# Patient Record
Sex: FEMALE | Race: OTHER | NOT HISPANIC OR LATINO
[De-identification: names, ages, dates, MRNs, and addresses within clinical notes are randomized per-mention and may not be internally consistent; named-entity substitution may affect disease eponyms.]

---

## 2019-10-08 ENCOUNTER — APPOINTMENT (OUTPATIENT)
Dept: ANTEPARTUM | Facility: CLINIC | Age: 24
End: 2019-10-08
Payer: MEDICAID

## 2019-10-08 PROCEDURE — 76816 OB US FOLLOW-UP PER FETUS: CPT

## 2019-10-08 PROCEDURE — 76819 FETAL BIOPHYS PROFIL W/O NST: CPT

## 2019-10-14 ENCOUNTER — TRANSCRIPTION ENCOUNTER (OUTPATIENT)
Age: 24
End: 2019-10-14

## 2019-10-14 ENCOUNTER — OUTPATIENT (OUTPATIENT)
Dept: INPATIENT UNIT | Facility: HOSPITAL | Age: 24
LOS: 1 days | Discharge: HOME | End: 2019-10-14
Payer: MEDICAID

## 2019-10-14 VITALS — HEART RATE: 85 BPM | DIASTOLIC BLOOD PRESSURE: 78 MMHG | SYSTOLIC BLOOD PRESSURE: 115 MMHG

## 2019-10-14 VITALS
HEART RATE: 82 BPM | RESPIRATION RATE: 16 BRPM | TEMPERATURE: 99 F | DIASTOLIC BLOOD PRESSURE: 89 MMHG | SYSTOLIC BLOOD PRESSURE: 121 MMHG

## 2019-10-14 DIAGNOSIS — Z3A.00 WEEKS OF GESTATION OF PREGNANCY NOT SPECIFIED: ICD-10-CM

## 2019-10-14 DIAGNOSIS — O26.899 OTHER SPECIFIED PREGNANCY RELATED CONDITIONS, UNSPECIFIED TRIMESTER: ICD-10-CM

## 2019-10-14 DIAGNOSIS — Z83.79 FAMILY HISTORY OF OTHER DISEASES OF THE DIGESTIVE SYSTEM: Chronic | ICD-10-CM

## 2019-10-14 PROCEDURE — 59025 FETAL NON-STRESS TEST: CPT | Mod: 26

## 2019-10-14 NOTE — OB PROVIDER TRIAGE NOTE - NSOBPROVIDERNOTE_OBGYN_ALL_OB_FT
23 y.o.  ALBERTA 10/19/19 @ 39.4 weeks presents with c/o ctx q 10-15 mins. Pt denies LOF, VB. States +FM. Antepartum course complicated by GDMA1.    allergies:  NKDA  medications:  prenatal vitamins    med/surg hx:  2011 nehrolithiasis  OBGYN hx:  complete sab x 1    abdomen soft, nontender  sve /-3/I  nst in progress 23 y.o.  ALBERTA 10/19/19 @ 39.4 weeks presents with c/o ctx q 10-15 mins. Pt denies LOF, VB. States +FM. Antepartum course complicated by GDMA1.    allergies:  NKDA  medications:  prenatal vitamins    med/surg hx:  2011 nehrolithiasis  OBGYN hx:  complete sab x 1    abdomen soft, nontender  sve /-3/I  nst in progress    1251    nst reactive  toco: ctx q 10-15 mins    no evidence of labor at this time    discussed with Dr Van. Pt discharged home with labor precautions/fetal kick counts reviewed. Encouraged increased PO hydration. F/U next scheduled prenatal appointment.    Derrick, NP

## 2019-10-14 NOTE — OB PROVIDER TRIAGE NOTE - HISTORY OF PRESENT ILLNESS
23 y.o.  ALBERTA 10/19/19 @ 39.4 weeks presents with c/o ctx q 10-15 mins. Pt denies LOF, VB. States +FM. Antepartum course complicated by GDMA1.

## 2019-10-15 ENCOUNTER — APPOINTMENT (OUTPATIENT)
Dept: OBGYN | Facility: CLINIC | Age: 24
End: 2019-10-15

## 2019-10-15 ENCOUNTER — INPATIENT (INPATIENT)
Facility: HOSPITAL | Age: 24
LOS: 2 days | Discharge: HOME | End: 2019-10-18
Attending: OBSTETRICS & GYNECOLOGY | Admitting: OBSTETRICS & GYNECOLOGY
Payer: MEDICAID

## 2019-10-15 ENCOUNTER — TRANSCRIPTION ENCOUNTER (OUTPATIENT)
Age: 24
End: 2019-10-15

## 2019-10-15 VITALS
TEMPERATURE: 98 F | DIASTOLIC BLOOD PRESSURE: 77 MMHG | SYSTOLIC BLOOD PRESSURE: 122 MMHG | RESPIRATION RATE: 17 BRPM | HEART RATE: 88 BPM

## 2019-10-15 DIAGNOSIS — K80.01 CALCULUS OF GALLBLADDER WITH ACUTE CHOLECYSTITIS WITH OBSTRUCTION: Chronic | ICD-10-CM

## 2019-10-15 DIAGNOSIS — O26.899 OTHER SPECIFIED PREGNANCY RELATED CONDITIONS, UNSPECIFIED TRIMESTER: ICD-10-CM

## 2019-10-15 DIAGNOSIS — Z22.330 CARRIER OF GROUP B STREPTOCOCCUS: ICD-10-CM

## 2019-10-15 DIAGNOSIS — Z3A.00 WEEKS OF GESTATION OF PREGNANCY NOT SPECIFIED: ICD-10-CM

## 2019-10-15 LAB
BASOPHILS # BLD AUTO: 0.07 K/UL — SIGNIFICANT CHANGE UP (ref 0–0.2)
BASOPHILS NFR BLD AUTO: 0.5 % — SIGNIFICANT CHANGE UP (ref 0–2)
BLD GP AB SCN SERPL QL: NEGATIVE — SIGNIFICANT CHANGE UP
EOSINOPHIL # BLD AUTO: 0.02 K/UL — SIGNIFICANT CHANGE UP (ref 0–0.5)
EOSINOPHIL NFR BLD AUTO: 0.1 % — SIGNIFICANT CHANGE UP (ref 0–6)
GLUCOSE BLDC GLUCOMTR-MCNC: 115 MG/DL — HIGH (ref 70–99)
GLUCOSE BLDC GLUCOMTR-MCNC: 82 MG/DL — SIGNIFICANT CHANGE UP (ref 70–99)
HCT VFR BLD CALC: 42.4 % — SIGNIFICANT CHANGE UP (ref 34.5–45)
HGB BLD-MCNC: 13.9 G/DL — SIGNIFICANT CHANGE UP (ref 11.5–15.5)
IMM GRANULOCYTES NFR BLD AUTO: 2.1 % — HIGH (ref 0–1.5)
LYMPHOCYTES # BLD AUTO: 14.2 % — SIGNIFICANT CHANGE UP (ref 13–44)
LYMPHOCYTES # BLD AUTO: 2.13 K/UL — SIGNIFICANT CHANGE UP (ref 1–3.3)
MCHC RBC-ENTMCNC: 28.1 PG — SIGNIFICANT CHANGE UP (ref 27–34)
MCHC RBC-ENTMCNC: 32.8 % — SIGNIFICANT CHANGE UP (ref 32–36)
MCV RBC AUTO: 85.8 FL — SIGNIFICANT CHANGE UP (ref 80–100)
MONOCYTES # BLD AUTO: 0.6 K/UL — SIGNIFICANT CHANGE UP (ref 0–0.9)
MONOCYTES NFR BLD AUTO: 4 % — SIGNIFICANT CHANGE UP (ref 2–14)
NEUTROPHILS # BLD AUTO: 11.84 K/UL — HIGH (ref 1.8–7.4)
NEUTROPHILS NFR BLD AUTO: 79.1 % — HIGH (ref 43–77)
NRBC # FLD: 0.03 K/UL — SIGNIFICANT CHANGE UP (ref 0–0)
PLATELET # BLD AUTO: 218 K/UL — SIGNIFICANT CHANGE UP (ref 150–400)
PMV BLD: 11.6 FL — SIGNIFICANT CHANGE UP (ref 7–13)
RBC # BLD: 4.94 M/UL — SIGNIFICANT CHANGE UP (ref 3.8–5.2)
RBC # FLD: 15.2 % — HIGH (ref 10.3–14.5)
RH IG SCN BLD-IMP: POSITIVE — SIGNIFICANT CHANGE UP
RH IG SCN BLD-IMP: POSITIVE — SIGNIFICANT CHANGE UP
T PALLIDUM AB TITR SER: NEGATIVE — SIGNIFICANT CHANGE UP
WBC # BLD: 14.97 K/UL — HIGH (ref 3.8–10.5)
WBC # FLD AUTO: 14.97 K/UL — HIGH (ref 3.8–10.5)

## 2019-10-15 PROCEDURE — 59410 OBSTETRICAL CARE: CPT | Mod: U9

## 2019-10-15 RX ORDER — HYDROMORPHONE HYDROCHLORIDE 2 MG/ML
1 INJECTION INTRAMUSCULAR; INTRAVENOUS; SUBCUTANEOUS
Refills: 0 | Status: DISCONTINUED | OUTPATIENT
Start: 2019-10-15 | End: 2019-10-17

## 2019-10-15 RX ORDER — TETANUS TOXOID, REDUCED DIPHTHERIA TOXOID AND ACELLULAR PERTUSSIS VACCINE, ADSORBED 5; 2.5; 8; 8; 2.5 [IU]/.5ML; [IU]/.5ML; UG/.5ML; UG/.5ML; UG/.5ML
0.5 SUSPENSION INTRAMUSCULAR ONCE
Refills: 0 | Status: DISCONTINUED | OUTPATIENT
Start: 2019-10-15 | End: 2019-10-18

## 2019-10-15 RX ORDER — SODIUM CHLORIDE 9 MG/ML
1000 INJECTION, SOLUTION INTRAVENOUS ONCE
Refills: 0 | Status: COMPLETED | OUTPATIENT
Start: 2019-10-15 | End: 2019-10-15

## 2019-10-15 RX ORDER — HYDROMORPHONE HYDROCHLORIDE 2 MG/ML
0.5 INJECTION INTRAMUSCULAR; INTRAVENOUS; SUBCUTANEOUS
Refills: 0 | Status: DISCONTINUED | OUTPATIENT
Start: 2019-10-15 | End: 2019-10-17

## 2019-10-15 RX ORDER — AMPICILLIN TRIHYDRATE 250 MG
1 CAPSULE ORAL EVERY 4 HOURS
Refills: 0 | Status: DISCONTINUED | OUTPATIENT
Start: 2019-10-15 | End: 2019-10-15

## 2019-10-15 RX ORDER — SIMETHICONE 80 MG/1
80 TABLET, CHEWABLE ORAL EVERY 4 HOURS
Refills: 0 | Status: DISCONTINUED | OUTPATIENT
Start: 2019-10-15 | End: 2019-10-18

## 2019-10-15 RX ORDER — AMPICILLIN TRIHYDRATE 250 MG
2 CAPSULE ORAL ONCE
Refills: 0 | Status: COMPLETED | OUTPATIENT
Start: 2019-10-15 | End: 2019-10-15

## 2019-10-15 RX ORDER — FAMOTIDINE 10 MG/ML
20 INJECTION INTRAVENOUS ONCE
Refills: 0 | Status: COMPLETED | OUTPATIENT
Start: 2019-10-15 | End: 2019-10-15

## 2019-10-15 RX ORDER — OXYCODONE HYDROCHLORIDE 5 MG/1
5 TABLET ORAL
Refills: 0 | Status: DISCONTINUED | OUTPATIENT
Start: 2019-10-15 | End: 2019-10-15

## 2019-10-15 RX ORDER — MAGNESIUM HYDROXIDE 400 MG/1
30 TABLET, CHEWABLE ORAL
Refills: 0 | Status: DISCONTINUED | OUTPATIENT
Start: 2019-10-15 | End: 2019-10-18

## 2019-10-15 RX ORDER — ONDANSETRON 8 MG/1
4 TABLET, FILM COATED ORAL EVERY 6 HOURS
Refills: 0 | Status: DISCONTINUED | OUTPATIENT
Start: 2019-10-15 | End: 2019-10-17

## 2019-10-15 RX ORDER — SODIUM CHLORIDE 9 MG/ML
1000 INJECTION, SOLUTION INTRAVENOUS
Refills: 0 | Status: DISCONTINUED | OUTPATIENT
Start: 2019-10-15 | End: 2019-10-15

## 2019-10-15 RX ORDER — DIPHENHYDRAMINE HCL 50 MG
25 CAPSULE ORAL EVERY 6 HOURS
Refills: 0 | Status: DISCONTINUED | OUTPATIENT
Start: 2019-10-15 | End: 2019-10-18

## 2019-10-15 RX ORDER — OXYTOCIN 10 UNIT/ML
2 VIAL (ML) INJECTION
Qty: 30 | Refills: 0 | Status: DISCONTINUED | OUTPATIENT
Start: 2019-10-15 | End: 2019-10-15

## 2019-10-15 RX ORDER — SODIUM CHLORIDE 9 MG/ML
1000 INJECTION, SOLUTION INTRAVENOUS
Refills: 0 | Status: DISCONTINUED | OUTPATIENT
Start: 2019-10-15 | End: 2019-10-16

## 2019-10-15 RX ORDER — ONDANSETRON 8 MG/1
4 TABLET, FILM COATED ORAL ONCE
Refills: 0 | Status: DISCONTINUED | OUTPATIENT
Start: 2019-10-15 | End: 2019-10-17

## 2019-10-15 RX ORDER — NALOXONE HYDROCHLORIDE 4 MG/.1ML
0.1 SPRAY NASAL
Refills: 0 | Status: DISCONTINUED | OUTPATIENT
Start: 2019-10-15 | End: 2019-10-17

## 2019-10-15 RX ORDER — OXYTOCIN 10 UNIT/ML
333.33 VIAL (ML) INJECTION
Qty: 20 | Refills: 0 | Status: DISCONTINUED | OUTPATIENT
Start: 2019-10-15 | End: 2019-10-15

## 2019-10-15 RX ORDER — LANOLIN
1 OINTMENT (GRAM) TOPICAL EVERY 6 HOURS
Refills: 0 | Status: DISCONTINUED | OUTPATIENT
Start: 2019-10-15 | End: 2019-10-18

## 2019-10-15 RX ORDER — METOCLOPRAMIDE HCL 10 MG
10 TABLET ORAL ONCE
Refills: 0 | Status: COMPLETED | OUTPATIENT
Start: 2019-10-15 | End: 2019-10-15

## 2019-10-15 RX ORDER — DOCUSATE SODIUM 100 MG
100 CAPSULE ORAL
Refills: 0 | Status: DISCONTINUED | OUTPATIENT
Start: 2019-10-15 | End: 2019-10-18

## 2019-10-15 RX ORDER — OXYCODONE HYDROCHLORIDE 5 MG/1
10 TABLET ORAL
Refills: 0 | Status: DISCONTINUED | OUTPATIENT
Start: 2019-10-15 | End: 2019-10-15

## 2019-10-15 RX ORDER — GLYCERIN ADULT
1 SUPPOSITORY, RECTAL RECTAL AT BEDTIME
Refills: 0 | Status: DISCONTINUED | OUTPATIENT
Start: 2019-10-15 | End: 2019-10-18

## 2019-10-15 RX ORDER — BUTORPHANOL TARTRATE 2 MG/ML
0.12 INJECTION, SOLUTION INTRAMUSCULAR; INTRAVENOUS EVERY 6 HOURS
Refills: 0 | Status: DISCONTINUED | OUTPATIENT
Start: 2019-10-15 | End: 2019-10-15

## 2019-10-15 RX ORDER — AMPICILLIN TRIHYDRATE 250 MG
CAPSULE ORAL
Refills: 0 | Status: DISCONTINUED | OUTPATIENT
Start: 2019-10-15 | End: 2019-10-15

## 2019-10-15 RX ORDER — OXYTOCIN 10 UNIT/ML
333.33 VIAL (ML) INJECTION
Qty: 20 | Refills: 0 | Status: DISCONTINUED | OUTPATIENT
Start: 2019-10-15 | End: 2019-10-17

## 2019-10-15 RX ORDER — CITRIC ACID/SODIUM CITRATE 300-500 MG
30 SOLUTION, ORAL ORAL ONCE
Refills: 0 | Status: COMPLETED | OUTPATIENT
Start: 2019-10-15 | End: 2019-10-15

## 2019-10-15 RX ORDER — DEXAMETHASONE 0.5 MG/5ML
4 ELIXIR ORAL EVERY 6 HOURS
Refills: 0 | Status: DISCONTINUED | OUTPATIENT
Start: 2019-10-15 | End: 2019-10-17

## 2019-10-15 RX ADMIN — Medication 1000 MILLIUNIT(S)/MIN: at 18:56

## 2019-10-15 RX ADMIN — Medication 216 GRAM(S): at 09:30

## 2019-10-15 RX ADMIN — Medication 10 MILLIGRAM(S): at 15:56

## 2019-10-15 RX ADMIN — FAMOTIDINE 20 MILLIGRAM(S): 10 INJECTION INTRAVENOUS at 15:56

## 2019-10-15 RX ADMIN — Medication 2 MILLIUNIT(S)/MIN: at 13:20

## 2019-10-15 RX ADMIN — Medication 30 MILLILITER(S): at 15:56

## 2019-10-15 RX ADMIN — Medication 108 GRAM(S): at 13:30

## 2019-10-15 RX ADMIN — SODIUM CHLORIDE 125 MILLILITER(S): 9 INJECTION, SOLUTION INTRAVENOUS at 09:30

## 2019-10-15 RX ADMIN — SODIUM CHLORIDE 125 MILLILITER(S): 9 INJECTION, SOLUTION INTRAVENOUS at 18:56

## 2019-10-15 RX ADMIN — ONDANSETRON 4 MILLIGRAM(S): 8 TABLET, FILM COATED ORAL at 22:46

## 2019-10-15 RX ADMIN — SODIUM CHLORIDE 2000 MILLILITER(S): 9 INJECTION, SOLUTION INTRAVENOUS at 08:50

## 2019-10-15 NOTE — DISCHARGE NOTE OB - MEDICATION SUMMARY - MEDICATIONS TO TAKE
I will START or STAY ON the medications listed below when I get home from the hospital:    ibuprofen 600 mg oral tablet  -- 1 tab(s) by mouth every 6 hours  -- Indication: For  delivery delivered    acetaminophen 325 mg oral tablet  -- 3 tab(s) by mouth every 6 hours  -- Indication: For  delivery delivered    PNV Prenatal oral tablet  -- 1 tab(s) by mouth once a day  -- Indication: For  delivery delivered

## 2019-10-15 NOTE — BRIEF OPERATIVE NOTE - OPERATION/FINDINGS
Delivery of viable male infant, apgars 9,10, weight=2890, cephalic presentation (OP, asynclitic). Grossly normal uterus, tubes, ovaries.   Hysterotomy closed in 2 layers. Philippe placed over hysterotomy. Excellent hemostasis noted.

## 2019-10-15 NOTE — OB RN PATIENT PROFILE - NS_ADMITVITALS_OBGYN_ALL_OB_DT
We will move forward with a repeat heart catheterization to assess your progressive fatigue. We are concerned that your symptoms may indicate a new blockage or narrowing of an old stent. Continue your ASA and Plavix. I am going to send in a new Rx for your 90 day supply today. Regarding your irregular heart rhythm afib, we will have you wear a 30 day monitor. We will also do an Echocardiogram in the near future. At this time, we will continue your Amiodarone. There are other options for treatement of sleep apnea there than a CPAP mask. 15-Oct-2019 09:20

## 2019-10-15 NOTE — DISCHARGE NOTE OB - PATIENT PORTAL LINK FT
You can access the FollowMyHealth Patient Portal offered by [facility] by registering at the following website: [email or web address]. By joining [employer]’s FollowMyHealth portal, you will also be able to view your health information using other applications (apps) compatible with our system.

## 2019-10-15 NOTE — OB NEONATOLOGY/PEDIATRICIAN DELIVERY SUMMARY - NSPEDSNEONOTESA_OBGYN_ALL_OB_FT
Baby is a 39.3 week GA M born to a 22 y/o  mother via unscheduled C/S for category 2 tracings. Maternal history of SABx1. Pregnancy complicated by GDMA1. Maternal blood type A+. Prenatal labs HIV, Hep B neg, Rubella immune, RPR nonreactive. GBS - on . ROM <18hrs with light meconium fluid. Baby born vigorous and crying spontaneously. Warmed, dried, stimulated. Apgars 9 / 10.

## 2019-10-15 NOTE — PROGRESS NOTE ADULT - SUBJECTIVE AND OBJECTIVE BOX
Pt admitted with categ 2 tracing for IOL>  Pt with repetitve late decels, unable to go up on pitocin, minimal cervical change. Recommend c/s at this time for categ 2 tracing remote from delivery.  Discussed with patient and partner who verbalized understanding.

## 2019-10-15 NOTE — DISCHARGE NOTE OB - MATERIALS PROVIDED
Guide to Postpartum Care/Breastfeeding Mother’s Support Group Information/[place]  Immunization Record/Breastfeeding Guide and Packet/[facility]  Screening Program/Breastfeeding Log/Back To Sleep Handout/Discharge Medication Information for Patients and Families Pocket Guide/Shaken Baby Prevention Handout/[facility] Hearing Screen Program/Birth Certificate Instructions

## 2019-10-15 NOTE — OB PROVIDER H&P - NSHPPHYSICALEXAM_GEN_ALL_CORE
VSS  Abdomen gravid, soft and nontender  NST - CAT 2 FHT; rep. late decelerations  SVE - 2/70/-3 Intact  Cephalic presentation  GBS - VSS  Abdomen gravid, soft and nontender  NST - CAT 2 FHT; rep. late decelerations  SVE - 2/70/-3 Intact  TAS - VTX/ELMER 90.8  BPP - 8/8  GBS - positive; for ampicillin

## 2019-10-15 NOTE — OB PROVIDER TRIAGE NOTE - HISTORY OF PRESENT ILLNESS
Patient is a  39 3/7wks gestation who reports to triage with c/o contractions since 0400.  Denies lof/vb.  Reports  good fetal movements.    AP Course - GDMA1  Meds - pnv  NKDA

## 2019-10-15 NOTE — BRIEF OPERATIVE NOTE - NSICDXBRIEFPREOP_GEN_ALL_CORE_FT
PRE-OP DIAGNOSIS:  Non-reassuring fetal heart tones complicating pregnancy, antepartum 15-Oct-2019 21:48:28  Yael Norman

## 2019-10-15 NOTE — OB PROVIDER TRIAGE NOTE - NSHPPHYSICALEXAM_GEN_ALL_CORE
VSS  Abdomen gravid, soft and nontender  NST -  SVE - 2/70/-3 Intact  Cephalic presentation VSS  Abdomen gravid, soft and nontender  NST - CAT 2; Late decelerations  Patient turned to lt/O2 mask in place  SVE - 2/70/-3 Intact  Cephalic presentation  TAS - vtx/mao 90.8  BPP - 8/8

## 2019-10-15 NOTE — DISCHARGE NOTE OB - CARE PROVIDER_API CALL
Hosea Butler)  MaternalFetal Medicine; Obstetrics and Gynecology  [address] [address] [place] [address], Room [address]  [place] [address]  Phone: [phone]  Fax: [phone]  Follow Up Time:

## 2019-10-15 NOTE — CHART NOTE - NSCHARTNOTEFT_GEN_A_CORE
R4 OB Progress Note    Patient seen and evaluated at bedside.  Received sign out on patient from Allie Tsang.  Presented w/ c/o contractions however only found to be 2 cm.   Tracing w/ intermittent late decels so decision made to keep patient for induction.      T(C): 36.9 (10-15-19 @ 07:27), Max: 37.4 (10-14-19 @ 11:56)  HR: 87 (10-15-19 @ 09:24) (78 - 88)  BP: 128/78 (10-15-19 @ 09:19) (115/78 - 128/78)  RR: 17 (10-15-19 @ 07:27) (16 - 17)  SpO2: 99% (10-15-19 @ 09:24) (99% - 100%)    SVE: 2-3/70/-3  mid position, firm tone    EFM: 140/mod pooja/+accel, intermittent late decels  [place]:  ctx intermittent     A/P 23y P0 admitted for IOL for category 2 tracing.   Tracing reviewed w/  Dr. Herbert.   Overall reassuring w/ moderate variability.   Will proceed with attempt at induction.   Will attempt resuscitation w/ lateral tilt, O2 and IV fluids and attempt induction w/ cervical balloon and hogan once tracing resuscitated.    -Labor: will place cervical balloon and start pitocin once tracing resucitated x 20-30 mins  -Fetus: category 2 tracing however overall reassuring as above  -GBS positive: continue ampicillin ppx  -Analgesia: declines at this time    Ann Martinez PGY-4  d/w Dr. Herbert  d/w Dr. Washburn

## 2019-10-15 NOTE — BRIEF OPERATIVE NOTE - NSICDXBRIEFPOSTOP_GEN_ALL_CORE_FT
POST-OP DIAGNOSIS:  Non-reassuring fetal heart tones complicating pregnancy, antepartum 15-Oct-2019 21:48:33  Yael Norman

## 2019-10-15 NOTE — CHART NOTE - NSCHARTNOTEFT_GEN_A_CORE
Pa note    patient seen & examined for possible placement of cervical balloon. Uncomfortable, denies pain interventions    VS  T(C): 36.9 (10-15-19 @ 07:27)  HR: 87 (10-15-19 @ 09:24)  BP: 128/78 (10-15-19 @ 09:19)  RR: 17 (10-15-19 @ 07:27)  SpO2: 99% (10-15-19 @ 09:24)    130/mod pooja/+accels/no decels  [place] q 4-5min  VE 3/80/-2    cont efm/toco  cervical balloon not placed at this time  will expectantly manage at this time  will dw Dr Wu Stiles PGY4  ldjesika de anda

## 2019-10-15 NOTE — DISCHARGE NOTE OB - CARE PLAN
Principal Discharge DX:	 delivery delivered  Goal:	recovery  Assessment and plan of treatment:	with Dr. Butler in 2 weeks

## 2019-10-15 NOTE — OB RN DELIVERY SUMMARY - NS_SEPSISRSKCALC_OBGYN_ALL_OB_FT
No temperature has been documented for this patient in CPN or on the OB Flowsheet. Ensure the highest temperature during labor was documented on the OB Flowsheet.  No gestational age at birth has been documented. Ensure delivery date/time has been entered above.  Rupture of membranes must be entered above. EOS calculated successfully. EOS Risk Factor: 0.5/1000 live births ([facility] national incidence); GA=39w3d; Temp=98.96; ROM=0; GBS='Positive'; Antibiotics='Broad spectrum antibiotics > 4 hrs prior to birth'

## 2019-10-15 NOTE — OB RN PATIENT PROFILE - PSH
<<-----Click on this checkbox to enter Past Surgical History Calculus of gallbladder with acute cholecystitis and obstruction

## 2019-10-15 NOTE — OB PROVIDER TRIAGE NOTE - NSOBPROVIDERNOTE_OBGYN_ALL_OB_FT
Patient is a  39 3/7wks gestation who reports to triage with c/o contractions since 0400.  Denies lof/vb.  Reports  good fetal movements.    AP Course - GDMA1  Meds - pnv  NKDA    PMH/GYN/SH - deneis  PSH - nephrolithotomy - 8yrs ago  OB - missed ab - 1/2019    VSS  Abdomen gravid, soft and nontender  NST -  SVE - 2/70/-3 Intact  Cephalic presentation    Discussed patient with   Plan: Patient is a  39 3/7wks gestation who reports to triage with c/o contractions since 0400.  Denies lof/vb.  Reports  good fetal movements.    AP Course - GDMA1  Meds - pnv  NKDA    PMH/GYN/SH - deneis  PSH - nephrolithotomy - 8yrs ago  OB - missed ab - 1/2019    VSS  Abdomen gravid, soft and nontender  NST - CAT 2; Late decelerations  Patient turned to lt/O2 mask in place  SVE - 2/70/-3 Intact  Cephalic presentation  TAS - vtx/mao 90.8  BPP - 8/8    Discussed patient with Dr. Washburn.  FHT reviewed by Dr Martinez.  Plan:  admit to L&D for IOL 2' CAT 2 FHT.

## 2019-10-15 NOTE — OB PROVIDER H&P - ASSESSMENT
VSS; FS 82mg/dl  Patient is a 22 y/o  @ 39 3/7wks gestation in early labor with a CAT 2 FHT.  GDMA1.  GBS

## 2019-10-16 LAB
BASOPHILS # BLD AUTO: 0.04 K/UL — SIGNIFICANT CHANGE UP (ref 0–0.2)
BASOPHILS NFR BLD AUTO: 0.2 % — SIGNIFICANT CHANGE UP (ref 0–2)
EOSINOPHIL # BLD AUTO: 0.05 K/UL — SIGNIFICANT CHANGE UP (ref 0–0.5)
EOSINOPHIL NFR BLD AUTO: 0.3 % — SIGNIFICANT CHANGE UP (ref 0–6)
HCT VFR BLD CALC: 35.8 % — SIGNIFICANT CHANGE UP (ref 34.5–45)
HGB BLD-MCNC: 11.6 G/DL — SIGNIFICANT CHANGE UP (ref 11.5–15.5)
IMM GRANULOCYTES NFR BLD AUTO: 0.9 % — SIGNIFICANT CHANGE UP (ref 0–1.5)
LYMPHOCYTES # BLD AUTO: 1.68 K/UL — SIGNIFICANT CHANGE UP (ref 1–3.3)
LYMPHOCYTES # BLD AUTO: 8.6 % — LOW (ref 13–44)
MCHC RBC-ENTMCNC: 28.2 PG — SIGNIFICANT CHANGE UP (ref 27–34)
MCHC RBC-ENTMCNC: 32.4 % — SIGNIFICANT CHANGE UP (ref 32–36)
MCV RBC AUTO: 86.9 FL — SIGNIFICANT CHANGE UP (ref 80–100)
MONOCYTES # BLD AUTO: 0.63 K/UL — SIGNIFICANT CHANGE UP (ref 0–0.9)
MONOCYTES NFR BLD AUTO: 3.2 % — SIGNIFICANT CHANGE UP (ref 2–14)
NEUTROPHILS # BLD AUTO: 16.92 K/UL — HIGH (ref 1.8–7.4)
NEUTROPHILS NFR BLD AUTO: 86.8 % — HIGH (ref 43–77)
NRBC # FLD: 0.03 K/UL — SIGNIFICANT CHANGE UP (ref 0–0)
PLATELET # BLD AUTO: 181 K/UL — SIGNIFICANT CHANGE UP (ref 150–400)
PMV BLD: 11.5 FL — SIGNIFICANT CHANGE UP (ref 7–13)
RBC # BLD: 4.12 M/UL — SIGNIFICANT CHANGE UP (ref 3.8–5.2)
RBC # FLD: 15.5 % — HIGH (ref 10.3–14.5)
WBC # BLD: 19.49 K/UL — HIGH (ref 3.8–10.5)
WBC # FLD AUTO: 19.49 K/UL — HIGH (ref 3.8–10.5)

## 2019-10-16 RX ORDER — KETOROLAC TROMETHAMINE 30 MG/ML
30 SYRINGE (ML) INJECTION EVERY 6 HOURS
Refills: 0 | Status: DISCONTINUED | OUTPATIENT
Start: 2019-10-16 | End: 2019-10-17

## 2019-10-16 RX ORDER — HEPARIN SODIUM 5000 [USP'U]/ML
5000 INJECTION INTRAVENOUS; SUBCUTANEOUS EVERY 12 HOURS
Refills: 0 | Status: DISCONTINUED | OUTPATIENT
Start: 2019-10-16 | End: 2019-10-18

## 2019-10-16 RX ORDER — METOCLOPRAMIDE HCL 10 MG
5 TABLET ORAL ONCE
Refills: 0 | Status: COMPLETED | OUTPATIENT
Start: 2019-10-16 | End: 2019-10-16

## 2019-10-16 RX ORDER — SODIUM CHLORIDE 9 MG/ML
1000 INJECTION, SOLUTION INTRAVENOUS
Refills: 0 | Status: DISCONTINUED | OUTPATIENT
Start: 2019-10-16 | End: 2019-10-17

## 2019-10-16 RX ADMIN — Medication 30 MILLIGRAM(S): at 11:51

## 2019-10-16 RX ADMIN — Medication 30 MILLIGRAM(S): at 00:30

## 2019-10-16 RX ADMIN — ONDANSETRON 4 MILLIGRAM(S): 8 TABLET, FILM COATED ORAL at 04:47

## 2019-10-16 RX ADMIN — HEPARIN SODIUM 5000 UNIT(S): 5000 INJECTION INTRAVENOUS; SUBCUTANEOUS at 14:04

## 2019-10-16 RX ADMIN — Medication 5 MILLIGRAM(S): at 08:32

## 2019-10-16 RX ADMIN — HEPARIN SODIUM 5000 UNIT(S): 5000 INJECTION INTRAVENOUS; SUBCUTANEOUS at 01:55

## 2019-10-16 RX ADMIN — Medication 4 MILLIGRAM(S): at 00:04

## 2019-10-16 RX ADMIN — Medication 30 MILLIGRAM(S): at 00:07

## 2019-10-16 RX ADMIN — Medication 30 MILLIGRAM(S): at 06:09

## 2019-10-16 RX ADMIN — Medication 30 MILLIGRAM(S): at 18:27

## 2019-10-16 NOTE — PROGRESS NOTE ADULT - ASSESSMENT
A/P: 24yo POD#1 s/p LTCS.  Patient continues to vomit with PO intake. Will keep  running for now. Zofran PRN and will reassess at noon

## 2019-10-16 NOTE — PROVIDER CONTACT NOTE (OTHER) - ASSESSMENT
pt vomiting, medicated with Zofran ,then decadron with relief ,pt fell asleep,0430 ,pt awake and again vomited ,output qs, IV fluids  in progress, fundus firm ,lochia moderate ,vital signs stable ,pain well controlled

## 2019-10-16 NOTE — PROGRESS NOTE ADULT - PROBLEM SELECTOR PLAN 1
- Continue regular diet.  - Increase ambulation.  - Continue motrin, tylenol, oxycodone PRN for pain control.  - F/u AM CBC    Kuldip Badillo PGY1

## 2019-10-16 NOTE — PROGRESS NOTE ADULT - SUBJECTIVE AND OBJECTIVE BOX
OB Progress Note:  Delivery, POD#1    S: 24yo POD#1 s/p LTCS . Her pain is well controlled. Pt complained of N/V overnight and recieved Zofranx2 and Decadron. Denies CP/SOB/lightheadedness/dizziness. Pt denies sxs of PEC: denies headache, visual changes, RUQ pain, respiratory distress  She is ambulating without difficulty.   Voiding spontaneously.     O:   Vital Signs Last 24 Hrs  T(C): 37 (16 Oct 2019 06:38), Max: 37.2 (15 Oct 2019 13:24)  T(F): 98.6 (16 Oct 2019 06:38), Max: 98.96 (15 Oct 2019 13:24)  HR: 62 (16 Oct 2019 06:38) (55 - 125)  BP: 108/68 (16 Oct 2019 06:38) (94/60 - 145/64)  BP(mean): 81 (15 Oct 2019 21:30) (68 - 88)  RR: 17 (16 Oct 2019 06:38) (13 - 24)  SpO2: 97% (16 Oct 2019 06:38) (93% - 100%)    Labs:  Blood type: A Positive  Rubella IgG: RPR: Negative                          13.9   14.97<H> >-----------< 218    ( 10-15 @ 09:26 )             42.4          PE:  General: NAD  Abdomen: Mildly distended, appropriately tender, incision c/d/i.  Extremities: No erythema, no pitting edema

## 2019-10-17 RX ORDER — IBUPROFEN 200 MG
1 TABLET ORAL
Qty: 0 | Refills: 0 | DISCHARGE
Start: 2019-10-17

## 2019-10-17 RX ORDER — ACETAMINOPHEN 500 MG
975 TABLET ORAL EVERY 6 HOURS
Refills: 0 | Status: DISCONTINUED | OUTPATIENT
Start: 2019-10-17 | End: 2019-10-18

## 2019-10-17 RX ORDER — IBUPROFEN 200 MG
600 TABLET ORAL EVERY 6 HOURS
Refills: 0 | Status: DISCONTINUED | OUTPATIENT
Start: 2019-10-17 | End: 2019-10-18

## 2019-10-17 RX ORDER — OXYCODONE HYDROCHLORIDE 5 MG/1
5 TABLET ORAL ONCE
Refills: 0 | Status: DISCONTINUED | OUTPATIENT
Start: 2019-10-17 | End: 2019-10-18

## 2019-10-17 RX ORDER — OXYCODONE HYDROCHLORIDE 5 MG/1
5 TABLET ORAL
Refills: 0 | Status: DISCONTINUED | OUTPATIENT
Start: 2019-10-17 | End: 2019-10-18

## 2019-10-17 RX ORDER — ACETAMINOPHEN 500 MG
3 TABLET ORAL
Qty: 0 | Refills: 0 | DISCHARGE
Start: 2019-10-17

## 2019-10-17 RX ORDER — IBUPROFEN 200 MG
600 TABLET ORAL EVERY 6 HOURS
Refills: 0 | Status: COMPLETED | OUTPATIENT
Start: 2019-10-17 | End: 2020-09-14

## 2019-10-17 RX ADMIN — Medication 30 MILLIGRAM(S): at 14:58

## 2019-10-17 RX ADMIN — Medication 30 MILLIGRAM(S): at 00:39

## 2019-10-17 RX ADMIN — Medication 30 MILLIGRAM(S): at 06:25

## 2019-10-17 RX ADMIN — Medication 30 MILLIGRAM(S): at 01:00

## 2019-10-17 RX ADMIN — Medication 30 MILLIGRAM(S): at 15:15

## 2019-10-17 RX ADMIN — HEPARIN SODIUM 5000 UNIT(S): 5000 INJECTION INTRAVENOUS; SUBCUTANEOUS at 01:44

## 2019-10-17 RX ADMIN — SIMETHICONE 80 MILLIGRAM(S): 80 TABLET, CHEWABLE ORAL at 14:59

## 2019-10-17 RX ADMIN — Medication 600 MILLIGRAM(S): at 23:03

## 2019-10-17 RX ADMIN — Medication 30 MILLIGRAM(S): at 06:40

## 2019-10-17 RX ADMIN — Medication 100 MILLIGRAM(S): at 14:59

## 2019-10-17 RX ADMIN — HEPARIN SODIUM 5000 UNIT(S): 5000 INJECTION INTRAVENOUS; SUBCUTANEOUS at 14:59

## 2019-10-17 RX ADMIN — Medication 600 MILLIGRAM(S): at 23:30

## 2019-10-17 NOTE — PROGRESS NOTE ADULT - SUBJECTIVE AND OBJECTIVE BOX
OB Progress Note: pTLCS, POD#2    S: 24yo POD#2 s/p LTCS. Her pain is well controlled. She is tolerating a regular diet and passing flatus. Voiding spontaneously. Denies N/V/D. Denies CP/SOB/lightheadedness/dizziness. She is breastfeeding.    O:  Vitals:  Vital Signs Last 24 Hrs  T(C): 36.7 (17 Oct 2019 06:42), Max: 37.1 (16 Oct 2019 23:30)  T(F): 98.1 (17 Oct 2019 06:42), Max: 98.8 (16 Oct 2019 23:30)  HR: 70 (17 Oct 2019 06:42) (70 - 79)  BP: 113/74 (17 Oct 2019 06:42) (108/75 - 113/74)  RR: 18 (17 Oct 2019 06:42) (18 - 19)  SpO2: 99% (17 Oct 2019 06:42) (97% - 99%)    MEDICATIONS  (STANDING):  diphtheria/tetanus/pertussis (acellular) Vaccine (ADAcel) 0.5 milliLiter(s) IntraMuscular once  heparin  Injectable 5000 Unit(s) SubCutaneous every 12 hours  ibuprofen  Tablet. 600 milliGRAM(s) Oral every 6 hours  ketorolac   Injectable 30 milliGRAM(s) IV Push every 6 hours      MEDICATIONS  (PRN):  diphenhydrAMINE 25 milliGRAM(s) Oral every 6 hours PRN Itching  docusate sodium 100 milliGRAM(s) Oral two times a day PRN Stool softening  glycerin Suppository - Adult 1 Suppository(s) Rectal at bedtime PRN Constipation  lanolin Ointment 1 Application(s) Topical every 6 hours PRN Sore Nipples  magnesium hydroxide Suspension 30 milliLiter(s) Oral two times a day PRN Constipation  oxyCODONE    IR 5 milliGRAM(s) Oral every 3 hours PRN Moderate to Severe Pain (4-10)  oxyCODONE    IR 5 milliGRAM(s) Oral once PRN Moderate to Severe Pain (4-10)  simethicone 80 milliGRAM(s) Chew every 4 hours PRN Gas      Labs:  Blood type: A Positive  Rubella IgG: RPR: Negative                          11.6   19.49<H> >-----------< 181    ( 10-16 @ 06:17 )             35.8                        13.9   14.97<H> >-----------< 218    ( 10-15 @ 09:26 )             42.4                  PE:  General: NAD  Abdomen: Soft, appropriately tender, incision c/d/i with dermabond  Extremities: No erythema, no pitting edema    A/P: 24yo POD#2 s/p pLTCS. EBL: .  - Continue regular diet.  - Increase ambulation.  - Continue motrin, tylenol, oxycodone PRN for pain control.  - Discharge planning tomorrow    Aretha Garcia MD

## 2019-10-18 VITALS
DIASTOLIC BLOOD PRESSURE: 76 MMHG | HEART RATE: 70 BPM | OXYGEN SATURATION: 99 % | SYSTOLIC BLOOD PRESSURE: 112 MMHG | TEMPERATURE: 98 F | RESPIRATION RATE: 18 BRPM

## 2019-10-18 RX ADMIN — Medication 600 MILLIGRAM(S): at 06:35

## 2019-10-18 RX ADMIN — Medication 100 MILLIGRAM(S): at 10:09

## 2019-10-18 RX ADMIN — HEPARIN SODIUM 5000 UNIT(S): 5000 INJECTION INTRAVENOUS; SUBCUTANEOUS at 06:06

## 2019-10-18 RX ADMIN — Medication 600 MILLIGRAM(S): at 06:05

## 2019-10-18 RX ADMIN — SIMETHICONE 80 MILLIGRAM(S): 80 TABLET, CHEWABLE ORAL at 10:09

## 2019-10-18 RX ADMIN — Medication 975 MILLIGRAM(S): at 11:00

## 2019-10-18 RX ADMIN — Medication 975 MILLIGRAM(S): at 10:09

## 2019-10-18 NOTE — PROGRESS NOTE ADULT - SUBJECTIVE AND OBJECTIVE BOX
SUBJECTIVE:    Pain: Controlled    Complaints: None    MILESTONES:    Alert and Oriented x 3  [ x ]  Out of bed/ ambulating. [ x ]  Flatus:   Positive [ x ]  Negative [  ]  Bowel movement  [  ] Positive [  ] Negative   Voiding [x  ] Due to void [  ]   Carrera/Indwelling catheter in place [  ]  Diet: Regular [ x ]  Clears [  ]  NPO [  ]    Infant feeding:  Breast [  ]   Bottle [  ]  Both [X  ]  Feeding related issues and/or concerns:      OBJECTIVE:  T(C): 36.7 (10-18-19 @ 06:52), Max: 37 (10-17-19 @ 22:46)  HR: 70 (10-18-19 @ 06:52) (70 - 100)  BP: 112/76 (10-18-19 @ 06:52) (106/71 - 112/76)  RR: 18 (10-18-19 @ 06:52) (17 - 18)  SpO2: 99% (10-18-19 @ 06:52) (97% - 99%)  Wt(kg): --          Blood Type: A Positive    RPR: Negative          MEDICATIONS  (STANDING):  acetaminophen   Tablet .. 975 milliGRAM(s) Oral every 6 hours  diphtheria/tetanus/pertussis (acellular) Vaccine (ADAcel) 0.5 milliLiter(s) IntraMuscular once  heparin  Injectable 5000 Unit(s) SubCutaneous every 12 hours  ibuprofen  Tablet. 600 milliGRAM(s) Oral every 6 hours    MEDICATIONS  (PRN):  diphenhydrAMINE 25 milliGRAM(s) Oral every 6 hours PRN Itching  docusate sodium 100 milliGRAM(s) Oral two times a day PRN Stool softening  glycerin Suppository - Adult 1 Suppository(s) Rectal at bedtime PRN Constipation  lanolin Ointment 1 Application(s) Topical every 6 hours PRN Sore Nipples  magnesium hydroxide Suspension 30 milliLiter(s) Oral two times a day PRN Constipation  oxyCODONE    IR 5 milliGRAM(s) Oral every 3 hours PRN Moderate to Severe Pain (4-10)  oxyCODONE    IR 5 milliGRAM(s) Oral once PRN Moderate to Severe Pain (4-10)  simethicone 80 milliGRAM(s) Chew every 4 hours PRN Gas        ASSESSMENT:    23y     G  2    P  1011       PO Day#  3        Delivery: Primary [ X ]    Repeat [  ]     EBL - 800                                    Indication of procedure: Abnormal Fetal Status    Condition: Stable    Past Medical History significant for: HPI:  Patient is a  39 3/7wks gestation who reports to triage with c/o contractions since 0400.  Denies lof/vb.  Reports  good fetal movements.    AP Course - GDMA1  Meds - pnv  NKDA (15 Oct 2019 08:51)      Current Issues:    Breasts:  Soft [x  ]   Engorged [  ]  Nipples:  Abdomen: Soft [ x ]   Distended [  ] Nontender [  ]     Bowel sounds :  Present [  ]  Absent [  ]   Fundus:  Firm [x  ]  Boggy [  ]    Abdominal incision: Clean, Dry and Intact [x  ]  Staples [  ] Steri Strips [  ] Dermabond [ X ] Sutures [  ]    Patient wearing abdominal binder for support.    Vaginal: Lochia:  Heavy [  ]  Moderate [ x ]   Scant [  ]    Extremities: Edema [ X ] Negative Mary's Sign [X  ] Nontender Mario  [ x ] Positive pedal pulses [  ]    Other relevant physical exam findings:      PLAN:    Plan: Increase ambulation, analgesia PRN and pain medication protocol standing oxycodone, ibuprofen and acetaminophen.    Diet: Regular diet    Diabetes - For Repeat Glucose Testing in 6 Weeks    Continue routine post-operative and postpartum care.     Discharge Planning [ x ]    For discharge Today  [  X  ]    Consults:  Social Work [  ]  Lactation [ x ]  Other [         ]

## 2019-10-25 PROBLEM — O03.9 COMPLETE OR UNSPECIFIED SPONTANEOUS ABORTION WITHOUT COMPLICATION: Chronic | Status: ACTIVE | Noted: 2019-10-14

## 2019-10-28 ENCOUNTER — APPOINTMENT (OUTPATIENT)
Dept: OBGYN | Facility: CLINIC | Age: 24
End: 2019-10-28
Payer: MEDICAID

## 2019-10-28 PROCEDURE — 0503F POSTPARTUM CARE VISIT: CPT

## 2019-10-29 ENCOUNTER — TRANSCRIPTION ENCOUNTER (OUTPATIENT)
Age: 24
End: 2019-10-29

## 2019-11-26 ENCOUNTER — APPOINTMENT (OUTPATIENT)
Dept: OBGYN | Facility: CLINIC | Age: 24
End: 2019-11-26

## 2019-12-02 ENCOUNTER — APPOINTMENT (OUTPATIENT)
Dept: OBGYN | Facility: CLINIC | Age: 24
End: 2019-12-02
Payer: MEDICAID

## 2019-12-02 PROCEDURE — 0503F POSTPARTUM CARE VISIT: CPT

## 2019-12-02 PROCEDURE — 36415 COLL VENOUS BLD VENIPUNCTURE: CPT

## 2019-12-02 PROCEDURE — 99211 OFF/OP EST MAY X REQ PHY/QHP: CPT | Mod: 25

## 2020-01-14 ENCOUNTER — APPOINTMENT (OUTPATIENT)
Dept: OBGYN | Facility: CLINIC | Age: 25
End: 2020-01-14

## 2020-01-23 ENCOUNTER — APPOINTMENT (OUTPATIENT)
Dept: OBGYN | Facility: CLINIC | Age: 25
End: 2020-01-23
Payer: COMMERCIAL

## 2020-01-23 PROCEDURE — 99395 PREV VISIT EST AGE 18-39: CPT

## 2021-03-28 PROBLEM — Z00.00 ENCOUNTER FOR PREVENTIVE HEALTH EXAMINATION: Status: ACTIVE | Noted: 2021-03-28

## 2023-12-13 NOTE — OB PROVIDER TRIAGE NOTE - NSPRENATALCARE_OBGYN_ALL_OB
Ss note:1/5/2021 10:04 AM Covid negative on 12-. Per Richie Lynn at [facility], Rick has been obtained and bed is available today, Charge nurse notified physician and he wants to see pt prior to discharge. SW faxed negative covid result to facility. Will await discharge order to arrange transfer to SNF, need signed KELLY.   ANGELO Hoover
done
Yes